# Patient Record
Sex: MALE | URBAN - METROPOLITAN AREA
[De-identification: names, ages, dates, MRNs, and addresses within clinical notes are randomized per-mention and may not be internally consistent; named-entity substitution may affect disease eponyms.]

---

## 2024-03-22 ENCOUNTER — TELEPHONE (OUTPATIENT)
Dept: UROLOGY | Facility: CLINIC | Age: 59
End: 2024-03-22
Payer: OTHER GOVERNMENT

## 2024-03-22 NOTE — TELEPHONE ENCOUNTER
Returned patient call.  Patient is inquiring about how the procedure will be billed. I gave him the number to billing.  Patient acknowledged and verbalized understanding.  YANNA AIKEN    ----- Message from Ivett Garcia MA sent at 3/18/2024  5:37 PM CDT -----  Regarding: FW: Blu    ----- Message -----  From: Rogelio Brown  Sent: 3/15/2024  11:46 AM CDT  To: [facility]  Subject: Blu                                            Type: Patient Callback     Who called: Blu     What is the request in detail: Pt stated that he would like to know if the doctor does the testosterone pellets and if so what would be the cost. Please reach out to the patient with this information.     Can the clinic reply by [facility]? No     Would the patient rather a call back or a response via [facility]? Callback     Best call back number: .[phone]      Additional Information: